# Patient Record
(demographics unavailable — no encounter records)

---

## 2025-02-04 NOTE — ASSESSMENT
[FreeTextEntry1] : denied bowel or bladder incontinence  having severe pain reviewed MRI from [facility], needs back specialist and pain management for poss epidural given clinical call center # sent medrol pack for symptomatic relief cont NSAIDs as tolerated

## 2025-02-04 NOTE — REVIEW OF SYSTEMS
- defer to primary team    [Joint Pain] : joint pain [Back Pain] : back pain [Joint Swelling] : joint swelling [Unsteady Walk] : ataxia [Negative] : Constitutional

## 2025-02-04 NOTE — HISTORY OF PRESENT ILLNESS
[Home] : at home, [unfilled] , at the time of the visit. [Medical Office: ([facility])___] : at the medical office located in  [Verbal consent obtained from patient] : the patient, [unfilled] [FreeTextEntry8] : PT presenting for severe left sided leg pain unable to walk for 48 hours  in lots of pain  had reached out to pain management but could not get appt  has MRI done in [facility]- showed disc herniation Does not recall any enticing events

## 2025-02-19 NOTE — DISCUSSION/SUMMARY
[de-identified] : 57yM pw L lumbar radiculopathy, no relief from medrol dose pack.  The patient was extensively counseled on treatment options including but not limited to observation, rest/activity modification, bracing, anti-inflammatory medications, physical therapy, injections, and surgery.  The natural history of the disease was thoroughly explained.  We discussed that the majority of the time, this condition can be initially treated conservatively. The patient will proceed with: -PT -gabapentin rx provided - pt instructed to take with meals and not with other nsaid's including advil, aleve, and toradol.  The pt understands to stop taking the medication if any stomach sx develop or they develop any type of bleeding or bruising, at which point they will present to their PMD -pt was instructed on the importance of resting, icing and elevating to minimize swelling -Referral to Dr. Traylor   I have personally obtained the history, reviewed the ROS as noted, and performed the physical examination today.  The patient and I discussed the assessment and options and developed the plan.  All questions were answered and the patient stated their understanding of the treatment plan and appreciation of the visit.   My cumulative time spent on this patient's visit included: Preparation for the visit, review of the medical records, review of pertinent diagnostic studies, examination and counseling of the patient on the above diagnosis, treatment plan and prognosis, orders of diagnostic tests, medications and/or appropriate procedures and documentation in the medical records of today's visit.   Hunter Isbell MD

## 2025-02-19 NOTE — HISTORY OF PRESENT ILLNESS
[de-identified] : 57yM pt who presents after they felt a back strain.  Pt notes extending sitting and ascending/descending stairs worsens the pain and symptoms.  Pt has tried activity modification and NSAIDs with minimal relief, pain level remains a 6/10, no relief from dose back.  Also notes increased stiffness/worse motion in the back.  Has not trialed physical therapy or injections.  Pt denies numbness, paresthesias, f/c.  No BBI or saddle anesthesia, no clumsiness or change in motor function.  No hx of spinal surgery. Pt notes the pain interferes with activities of daily life and that overall mobility has been decreased.  They wish to discuss possible treatment options and return to baseline activity and mobility before symptomatic onset.  MRI shows multiple HNP.  Pain feels 10/10 and shoots to L foot

## 2025-02-19 NOTE — PHYSICAL EXAM
[de-identified] : Gen: NAD Resp: Nonlabored respirations Gait: Nl Head: CN I - XII grossly intact   Spine: Skin in tact, tender paraspinal region Full neck ROM UE: 5/5 D B T WE WF IO b/l SILT C4-T1 b/l (-)Dorian Sloan No hand atrophy 2+ rad bcr   LE: 5/5 IP Q HS EHL TA GS SILT L3-S1 b/l (-) clonus, (-) babinski (-) slr, c/l slr 2+ dp pt wwp bcr    [de-identified] : The following radiographs were ordered and read by me during this patient's visit. I reviewed each radiograph in detail with the patient and discussed the findings as highlighted below.   4 views of the lumbar spine were obtained today that show no fracture, or dislocation. There are l4-s1 degenerative changes seen w l5-s1 grade 1 slip. There is no malalignment or dynamic instability. Preserved lordosis.  No obvious osseous abnormality. Otherwise unremarkable.   I independently reviewed and interpreted the MRI of the L spine.  I discussed with the patient the MRI demonstrates l5-s1 grade 1 slip, diffuse degenerative changes, t12-l1 and l1-l2 grade 1 slips

## 2025-04-28 NOTE — HISTORY OF PRESENT ILLNESS
[FreeTextEntry1] : Annual physical  [de-identified] : 58 year old male presents for an annual physical.   He has h/o JESSICA- s/p Inspire implant  reports his sleep has improved   Has h/o ADHD started back on Vyvanse by his psychiatric NP has noted improvement with his focus and binge eating habits   also prescribed Quetiapine by his psychiatric NP to help with sleep   Has h/o lumbar radiculopathy seen by orthopedist tried on Gabapentin- did not feel an improvement currently his pain has improved- will plan to go for PT during the summer

## 2025-04-28 NOTE — HEALTH RISK ASSESSMENT
[Never] : Never [Yes] : Yes [2 - 4 times a month (2 pts)] : 2-4 times a month (2 points) [1 or 2 (0 pts)] : 1 or 2 (0 points) [Never (0 pts)] : Never (0 points) [No] : In the past 12 months have you used drugs other than those required for medical reasons? No [0] : 2) Feeling down, depressed, or hopeless: Not at all (0) [PHQ-2 Negative - No further assessment needed] : PHQ-2 Negative - No further assessment needed [Audit-CScore] : 2 [de-identified] : Riding a bicycle during the weekends, weight training, started playing ice hockey within the past month  [de-identified] : Working to improve upon his diet [LNU1Missf] : 0 [ColonoscopyDate] : 02/2020

## 2025-06-09 NOTE — ASSESSMENT
[FreeTextEntry1] : trial of Naltrexone, take as directed, risks and benefits discussed will look to connect patient to resources for assistance- will obtain advice from behavioral health counselor

## 2025-06-09 NOTE — HISTORY OF PRESENT ILLNESS
[Home] : at home, [unfilled] , at the time of the visit. [Telehealth (audio & video)] : This visit was provided via telehealth using real-time 2-way audio visual technology. [Medical Office: ([facility])___] : at the medical office located in  [Verbal consent obtained from patient] : the patient, [unfilled] [FreeTextEntry1] : Follow up- discuss alcohol abuse [de-identified] : 58 year old male presents via telehealth to discuss concerns for alcohol abuse. He states he goes through periods where he finds himself drinking about 7-9 drinks at night (usually hard liquor). He will skip 1-2 days and then resume drinking. He reports using alcohol as a coping mechanism. He has h/o alcoholism in his family. He is interested in starting Naltrexone to help.

## 2025-07-09 NOTE — HISTORY OF PRESENT ILLNESS
[FreeTextEntry1] : Follow up- alcohol abuse [de-identified] : 58 year old male presents for a follow up for h/o alcohol abuse. He had a telehealth visit a month ago to discuss his concerns about his alcohol use.. He had stated that he would go through periods where he has found himself drinking about 7-9 drinks at night (usually hard liquor). He had reported using alcohol as a coping mechanism. He has h/o alcoholism in his family. He was prescribed Naltrexone which he took for a week with improvement. He stopped the medication and has not yet needed to take the medication again. He has been able to abstain from alcohol use at this time. He does not feel he needs therapy at this time.

## 2025-07-09 NOTE — ASSESSMENT
[FreeTextEntry1] : can resume Naltrexone if needed will check blood work (assess LFTs) would recommend he consider assistance from detox/rehab center, information provided